# Patient Record
Sex: FEMALE | Race: WHITE | Employment: OTHER | URBAN - METROPOLITAN AREA
[De-identification: names, ages, dates, MRNs, and addresses within clinical notes are randomized per-mention and may not be internally consistent; named-entity substitution may affect disease eponyms.]

---

## 2024-09-10 ENCOUNTER — OFFICE VISIT (OUTPATIENT)
Dept: NEUROLOGY | Age: 84
End: 2024-09-10
Payer: MEDICARE

## 2024-09-10 VITALS — WEIGHT: 137 LBS | DIASTOLIC BLOOD PRESSURE: 64 MMHG | SYSTOLIC BLOOD PRESSURE: 108 MMHG | HEART RATE: 68 BPM

## 2024-09-10 DIAGNOSIS — M48.062 SPINAL STENOSIS OF LUMBAR REGION WITH NEUROGENIC CLAUDICATION: Primary | ICD-10-CM

## 2024-09-10 PROBLEM — I73.9 PERIPHERAL VASCULAR DISEASE OF FOOT (HCC): Status: ACTIVE | Noted: 2022-11-09

## 2024-09-10 PROBLEM — R73.02 GLUCOSE INTOLERANCE (IMPAIRED GLUCOSE TOLERANCE): Status: ACTIVE | Noted: 2018-01-18

## 2024-09-10 PROBLEM — M54.50 RECURRENT LOW BACK PAIN: Status: ACTIVE | Noted: 2022-11-04

## 2024-09-10 PROBLEM — L60.8 INCURVATED NAIL: Status: ACTIVE | Noted: 2022-11-09

## 2024-09-10 PROBLEM — B35.1 ONYCHOMYCOSIS: Status: ACTIVE | Noted: 2023-01-13

## 2024-09-10 PROBLEM — M72.2 PLANTAR FASCIITIS OF LEFT FOOT: Status: ACTIVE | Noted: 2021-04-26

## 2024-09-10 PROBLEM — M54.2 CHRONIC NECK PAIN: Status: ACTIVE | Noted: 2023-01-10

## 2024-09-10 PROBLEM — L21.9 SEBORRHEIC DERMATITIS: Status: ACTIVE | Noted: 2023-08-16

## 2024-09-10 PROBLEM — R20.2 PARESTHESIA: Status: ACTIVE | Noted: 2023-02-01

## 2024-09-10 PROBLEM — M51.36 DDD (DEGENERATIVE DISC DISEASE), LUMBAR: Status: ACTIVE | Noted: 2023-02-01

## 2024-09-10 PROBLEM — L85.3 XEROSIS OF SKIN: Status: ACTIVE | Noted: 2022-11-09

## 2024-09-10 PROBLEM — M54.17 RIGHT LUMBOSACRAL RADICULOPATHY: Status: ACTIVE | Noted: 2023-02-01

## 2024-09-10 PROBLEM — G89.29 CHRONIC NECK PAIN: Status: ACTIVE | Noted: 2023-01-10

## 2024-09-10 PROBLEM — M51.369 DDD (DEGENERATIVE DISC DISEASE), LUMBAR: Status: ACTIVE | Noted: 2023-02-01

## 2024-09-10 PROBLEM — L60.0 INGROWING TOENAIL: Status: ACTIVE | Noted: 2022-11-09

## 2024-09-10 PROBLEM — M24.573 EQUINUS CONTRACTURE OF ANKLE: Status: ACTIVE | Noted: 2021-04-26

## 2024-09-10 PROCEDURE — 1123F ACP DISCUSS/DSCN MKR DOCD: CPT | Performed by: PSYCHIATRY & NEUROLOGY

## 2024-09-10 PROCEDURE — 99204 OFFICE O/P NEW MOD 45 MIN: CPT | Performed by: PSYCHIATRY & NEUROLOGY

## 2024-09-10 RX ORDER — LATANOPROST 50 UG/ML
SOLUTION/ DROPS OPHTHALMIC
COMMUNITY
Start: 2024-08-02

## 2024-09-10 RX ORDER — BRIMONIDINE TARTRATE AND TIMOLOL MALEATE 2; 5 MG/ML; MG/ML
1 SOLUTION OPHTHALMIC 2 TIMES DAILY
COMMUNITY
Start: 2024-09-05

## 2024-09-10 RX ORDER — MECLIZINE HYDROCHLORIDE 25 MG/1
25 TABLET ORAL PRN
COMMUNITY
Start: 2018-06-21

## 2024-09-10 RX ORDER — FLUTICASONE PROPIONATE 50 MCG
1 SPRAY, SUSPENSION (ML) NASAL PRN
COMMUNITY
Start: 2023-01-10

## 2024-09-10 RX ORDER — CLOTRIMAZOLE AND BETAMETHASONE DIPROPIONATE 10; .64 MG/G; MG/G
CREAM TOPICAL 2 TIMES DAILY
COMMUNITY
Start: 2020-01-31

## 2024-09-10 RX ORDER — GABAPENTIN 100 MG/1
100 CAPSULE ORAL EVERY MORNING
COMMUNITY
Start: 2024-05-13

## 2024-09-10 RX ORDER — GABAPENTIN 300 MG/1
300 CAPSULE ORAL NIGHTLY
COMMUNITY
Start: 2023-07-14

## 2024-09-18 ENCOUNTER — HOSPITAL ENCOUNTER (OUTPATIENT)
Dept: MRI IMAGING | Age: 84
Discharge: HOME | End: 2024-09-20
Attending: PSYCHIATRY & NEUROLOGY
Payer: MEDICARE

## 2024-09-18 DIAGNOSIS — M48.062 SPINAL STENOSIS OF LUMBAR REGION WITH NEUROGENIC CLAUDICATION: ICD-10-CM

## 2024-09-18 PROCEDURE — 72148 MRI LUMBAR SPINE W/O DYE: CPT

## 2024-09-26 ENCOUNTER — TELEPHONE (OUTPATIENT)
Dept: NEUROLOGY | Age: 84
End: 2024-09-26

## 2024-09-26 DIAGNOSIS — M54.41 CHRONIC BILATERAL LOW BACK PAIN WITH RIGHT-SIDED SCIATICA: Primary | ICD-10-CM

## 2024-09-26 DIAGNOSIS — G89.29 CHRONIC BILATERAL LOW BACK PAIN WITH RIGHT-SIDED SCIATICA: Primary | ICD-10-CM

## 2024-10-04 NOTE — PROGRESS NOTES
Patient Name: Lala Nugent : 1940        Date: 10/16/2024      Type of Appt: Consult    Reason for appt: Chronic bilateral low back pain with right-sided sciatica     Referred by: Brice Gunter MD     Studies done:     2024 MRI LUMBAR SPINE WO CONTRAST [[id number]]     Surgeries: N/A    Physical therapy: NO    Smoking:    Tobacco Use    Never smoked or used smokeless tobacco.       REVIEW OF SYSTEMS:    Rash   Difficulty Urinating Nausea     Fever   Headaches  Bruising/Bleeding Easily     Hearing loss  Constipation  Sleep Disturbance    Shortness of breath Diarrhea  Neck Pain  Back Pain

## 2024-10-16 ENCOUNTER — INITIAL CONSULT (OUTPATIENT)
Age: 84
End: 2024-10-16
Payer: MEDICARE

## 2024-10-16 VITALS
BODY MASS INDEX: 25.98 KG/M2 | TEMPERATURE: 98.2 F | DIASTOLIC BLOOD PRESSURE: 60 MMHG | SYSTOLIC BLOOD PRESSURE: 140 MMHG | WEIGHT: 141.2 LBS | HEIGHT: 62 IN

## 2024-10-16 DIAGNOSIS — M48.02 CERVICAL STENOSIS OF SPINE: ICD-10-CM

## 2024-10-16 DIAGNOSIS — M48.062 SPINAL STENOSIS OF LUMBAR REGION WITH NEUROGENIC CLAUDICATION: Primary | ICD-10-CM

## 2024-10-16 PROCEDURE — 99204 OFFICE O/P NEW MOD 45 MIN: CPT | Performed by: NEUROLOGICAL SURGERY

## 2024-10-16 RX ORDER — DORZOLAMIDE HYDROCHLORIDE AND TIMOLOL MALEATE 20; 5 MG/ML; MG/ML
1 SOLUTION/ DROPS OPHTHALMIC 2 TIMES DAILY
COMMUNITY
Start: 2024-10-10

## 2024-10-16 ASSESSMENT — ENCOUNTER SYMPTOMS
ABDOMINAL DISTENTION: 0
SHORTNESS OF BREATH: 0
COUGH: 0
BACK PAIN: 1
TROUBLE SWALLOWING: 0
EYE PAIN: 0
ABDOMINAL PAIN: 0

## 2024-10-16 NOTE — PROGRESS NOTES
NEUROSURGERY CONSULT NOTE      Patient Name: Lala Nugent  Patient : 1940  MRN: [id number]       PCP: Yessi Witt DO        History of Present Ilness: 84 y.o. presents in neurosurgical consultation from Dr. Gunter for evaluation of lumbar stenosis. She has c/o LBP and BLE pain R>L to feet. Also numbness and weakness. Sx started 3 years ago and have worsened. No B/B incontinence.  No significant neck pain, she does tell me she has had difficulty with hand function and dropping things.  She is having unsteady gait but no pain radiating down her arms.  She was previously seen by rheumatology and treated for osteoporosis, this was stopped in .    Chief Complaint   Patient presents with    Consultation          Conservative Treatments:  Physical Therapy: Yes  NSAID's: Yes  Narcotics: No  Muscle relaxants: No  Epidural injections: No      Past Medical History:    No past medical history on file.    Past Surgical History:    No past surgical history on file.    Home Medications:   Prior to Admission medications    Medication Sig Start Date End Date Taking? Authorizing Provider   brimonidine-timolol (COMBIGAN) 0.2-0.5 % ophthalmic solution Apply 1 drop to eye 2 times daily 24  Yes Jennifer Jett MD   clotrimazole-betamethasone (LOTRISONE) 1-0.05 % cream Apply topically 2 times daily 20  Yes Jennifer Jett MD   fluticasone (FLONASE) 50 MCG/ACT nasal spray 1 spray by NOT APPLICABLE route as needed 1/10/23  Yes Jennifer Jett MD   gabapentin (NEURONTIN) 100 MG capsule Take 1 capsule by mouth every morning. 24  Yes Jennifer Jett MD   gabapentin (NEURONTIN) 300 MG capsule Take 1 capsule by mouth nightly. 23  Yes Jennifer Jett MD   latanoprost (XALATAN) 0.005 % ophthalmic solution PLACE 1 DROP INTO BOTH EYES AT BEDTIME 24  Yes Jennifer Jett MD   meclizine (ANTIVERT) 25 MG tablet Take 1 tablet by mouth as needed 18  Yes Maliha